# Patient Record
Sex: FEMALE | Race: WHITE
[De-identification: names, ages, dates, MRNs, and addresses within clinical notes are randomized per-mention and may not be internally consistent; named-entity substitution may affect disease eponyms.]

---

## 2021-03-04 ENCOUNTER — HOSPITAL ENCOUNTER (OUTPATIENT)
Dept: HOSPITAL 75 - CARD | Age: 21
End: 2021-03-04
Attending: NURSE PRACTITIONER
Payer: MEDICAID

## 2021-03-04 DIAGNOSIS — R10.84: Primary | ICD-10-CM

## 2021-03-04 PROCEDURE — 78227 HEPATOBIL SYST IMAGE W/DRUG: CPT

## 2021-03-04 NOTE — DIAGNOSTIC IMAGING REPORT
Indication: Generalized abdominal pain.



TECHNIQUE: Acquisitions were acquired over the abdomen after

administration of 5.5 mCi of technetium 99m Choletec. Ejection

fraction was cannulated, patient ingested ensure.



FINDINGS: There is homogeneous uptake of isotope throughout the

liver. Significant accumulation within the gallbladder by 30

minutes. There is free flow of activity in the small bowel.

Ejection fraction 51%



IMPRESSION: Normal hepatobiliary scan and ejection fraction.



Dictated by: 



  Dictated on workstation # [id number]